# Patient Record
Sex: MALE
[De-identification: names, ages, dates, MRNs, and addresses within clinical notes are randomized per-mention and may not be internally consistent; named-entity substitution may affect disease eponyms.]

---

## 2022-03-04 PROBLEM — Z00.00 ENCOUNTER FOR PREVENTIVE HEALTH EXAMINATION: Status: ACTIVE | Noted: 2022-03-04

## 2022-03-09 ENCOUNTER — APPOINTMENT (OUTPATIENT)
Dept: INTERVENTIONAL RADIOLOGY/VASCULAR | Facility: CLINIC | Age: 62
End: 2022-03-09
Payer: OTHER MISCELLANEOUS

## 2022-03-09 VITALS
DIASTOLIC BLOOD PRESSURE: 81 MMHG | RESPIRATION RATE: 17 BRPM | SYSTOLIC BLOOD PRESSURE: 128 MMHG | WEIGHT: 275 LBS | OXYGEN SATURATION: 98 % | HEART RATE: 86 BPM | BODY MASS INDEX: 38.93 KG/M2 | HEIGHT: 70.5 IN

## 2022-03-09 DIAGNOSIS — Z86.39 PERSONAL HISTORY OF OTHER ENDOCRINE, NUTRITIONAL AND METABOLIC DISEASE: ICD-10-CM

## 2022-03-09 DIAGNOSIS — Z82.49 FAMILY HISTORY OF ISCHEMIC HEART DISEASE AND OTHER DISEASES OF THE CIRCULATORY SYSTEM: ICD-10-CM

## 2022-03-09 DIAGNOSIS — M79.605 PAIN IN LEFT LEG: ICD-10-CM

## 2022-03-09 DIAGNOSIS — R60.0 LOCALIZED EDEMA: ICD-10-CM

## 2022-03-09 PROCEDURE — 99244 OFF/OP CNSLTJ NEW/EST MOD 40: CPT

## 2022-03-09 PROCEDURE — 99072 ADDL SUPL MATRL&STAF TM PHE: CPT

## 2022-03-09 RX ORDER — MULTIVITAMIN
TABLET ORAL
Refills: 0 | Status: ACTIVE | COMMUNITY

## 2022-03-09 RX ORDER — SIMVASTATIN 80 MG/1
TABLET, FILM COATED ORAL
Refills: 0 | Status: ACTIVE | COMMUNITY

## 2023-02-17 ENCOUNTER — APPOINTMENT (OUTPATIENT)
Dept: OTOLARYNGOLOGY | Facility: CLINIC | Age: 63
End: 2023-02-17
Payer: OTHER MISCELLANEOUS

## 2023-02-17 ENCOUNTER — NON-APPOINTMENT (OUTPATIENT)
Age: 63
End: 2023-02-17

## 2023-02-17 VITALS
DIASTOLIC BLOOD PRESSURE: 85 MMHG | WEIGHT: 290 LBS | HEART RATE: 81 BPM | SYSTOLIC BLOOD PRESSURE: 129 MMHG | BODY MASS INDEX: 41.05 KG/M2 | HEIGHT: 70.5 IN

## 2023-02-17 DIAGNOSIS — H93.13 TINNITUS, BILATERAL: ICD-10-CM

## 2023-02-17 DIAGNOSIS — G47.33 OBSTRUCTIVE SLEEP APNEA (ADULT) (PEDIATRIC): ICD-10-CM

## 2023-02-17 DIAGNOSIS — H91.93 UNSPECIFIED HEARING LOSS, BILATERAL: ICD-10-CM

## 2023-02-17 DIAGNOSIS — J34.2 DEVIATED NASAL SEPTUM: ICD-10-CM

## 2023-02-17 DIAGNOSIS — Z99.89 OBSTRUCTIVE SLEEP APNEA (ADULT) (PEDIATRIC): ICD-10-CM

## 2023-02-17 DIAGNOSIS — Z57.0 OCCUPATIONAL EXPOSURE TO NOISE: ICD-10-CM

## 2023-02-17 PROCEDURE — 92570 ACOUSTIC IMMITANCE TESTING: CPT

## 2023-02-17 PROCEDURE — 92557 COMPREHENSIVE HEARING TEST: CPT

## 2023-02-17 PROCEDURE — 99243 OFF/OP CNSLTJ NEW/EST LOW 30: CPT

## 2023-02-17 PROCEDURE — 99072 ADDL SUPL MATRL&STAF TM PHE: CPT

## 2023-02-17 SDOH — HEALTH STABILITY - PHYSICAL HEALTH: OCCUPATIONAL EXPOSURE TO NOISE: Z57.0

## 2023-02-17 NOTE — ASSESSMENT
[FreeTextEntry1] : Reviewed and reconciled medications, allergies, PMHx, PSHx, SocHx, FMHx.\par \par physical exam:\par air greater than bone on the right\par air greater than bone on the left\par No response in the middle line\par deviation of note to the right\par mildly inflamed turbs\par type 3 oral cavity\par \par Audio: b/l mild - moderately severe SNHL borderline candidate for hearing aids\par -92% at 65 dB bilaterally\par  TYPE A TYMPS AU\par ESSENTIALLY BORDERLINE NORMAL HEARING SLOPING TO A MOD/MOD-SEVERE HF SNHL 250-8000HZ AU \par \par Under WC guidelines:\par 3.75% binaural hearing loss\par \par Plan: Audio - results interpreted by Dr. Silva and reviewed with the patient. Avoid further loud noise. Consider amplification. \par In the quiet of the exam room, close up pt seems to do well with hearing without amplification\par \par \par \par

## 2023-02-17 NOTE — ADDENDUM
[FreeTextEntry1] : Documented by Scar Lockwood acting as scribe for Dr. Silva on 02/17/2023 All Medical record entries made by the Scribe were at my, Dr. Silva, direction and personally dictated by me on 02/17/2023  . I have reviewed the chart and agree that the record accurately reflects my personal performance of the history, physical exam, assessment and plan. I have also personally directed, reviewed, and agreed with the discharge instructions.

## 2023-02-17 NOTE — DATA REVIEWED
[de-identified] :  TYPE A TYMPS AU\par ESSENTIALLY BORDERLINE NORMAL HEARING SLOPING TO A MOD/MOD-SEVERE HF SNHL 250-8000HZ AU

## 2023-02-17 NOTE — REVIEW OF SYSTEMS
[Sneezing] : sneezing [Seasonal Allergies] : seasonal allergies [Post Nasal Drip] : post nasal drip [Ear Pain] : ear pain [Ear Itch] : ear itch [Ear Drainage] : ear drainage [Nasal Congestion] : nasal congestion [Problem Snoring] : problem snoring [Snoring With Pauses] : snoring with pauses [Throat Clearing] : throat clearing [Throat Pain] : throat pain [Throat Dryness] : throat dryness [Throat Itching] : throat itching [Swelling Neck] : swelling neck [Swelling Face] : face swelling [Anxiety] : anxiety [Depression] : depression [Negative] : Heme/Lymph

## 2023-02-17 NOTE — CONSULT LETTER
[Dear  ___] : Dear  [unfilled], [Courtesy Letter:] : I had the pleasure of seeing your patient, [unfilled], in my office today. [Referral Closing:] : Thank you very much for seeing this patient.  If you have any questions, please do not hesitate to contact me. [Sincerely,] : Sincerely, [FreeTextEntry3] : Luís Silva MD FACS\par

## 2023-02-17 NOTE — PHYSICAL EXAM
[] : septum deviated to the right [Midline] : trachea located in midline position [Normal] : no rashes [Hearing Loss Right Only] : normal [Hearing Loss Left Only] : normal [FreeTextEntry5] : air greater than bone on the right\par air greater than bone on the left\par No response in the middle line [de-identified] : mildly inflamed. [de-identified] : Oral cavity type 3

## 2023-02-17 NOTE — HISTORY OF PRESENT ILLNESS
[de-identified] : [place] [employer]  and [employer] on [employer]. Patient states ear protection was offered and he wore it when he thought he needed it. 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 No hearing test offered and never had hearing checked during his career. retired in December 2022 and last noise exposure was 13 months ago as he was injured on the job. Patient has tinnitus worse on the left than the right but bilaterally.  Patient able to understand and converse in normal levels. \par